# Patient Record
Sex: MALE | Race: ASIAN | NOT HISPANIC OR LATINO | Employment: FULL TIME | URBAN - METROPOLITAN AREA
[De-identification: names, ages, dates, MRNs, and addresses within clinical notes are randomized per-mention and may not be internally consistent; named-entity substitution may affect disease eponyms.]

---

## 2023-09-06 ENCOUNTER — OFFICE VISIT (OUTPATIENT)
Dept: FAMILY MEDICINE | Facility: CLINIC | Age: 33
End: 2023-09-06
Payer: COMMERCIAL

## 2023-09-06 VITALS
SYSTOLIC BLOOD PRESSURE: 125 MMHG | HEIGHT: 75 IN | RESPIRATION RATE: 17 BRPM | DIASTOLIC BLOOD PRESSURE: 85 MMHG | TEMPERATURE: 97.4 F | OXYGEN SATURATION: 98 % | WEIGHT: 243 LBS | HEART RATE: 80 BPM | BODY MASS INDEX: 30.21 KG/M2

## 2023-09-06 DIAGNOSIS — Z01.818 PREOP GENERAL PHYSICAL EXAM: ICD-10-CM

## 2023-09-06 DIAGNOSIS — S69.92XA INJURY OF LEFT INDEX FINGER, INITIAL ENCOUNTER: Primary | ICD-10-CM

## 2023-09-06 PROCEDURE — 99203 OFFICE O/P NEW LOW 30 MIN: CPT | Performed by: PHYSICIAN ASSISTANT

## 2023-09-06 RX ORDER — AMOXICILLIN AND CLAVULANATE POTASSIUM 562.5; 437.5; 62.5 MG/1; MG/1; MG/1
2 TABLET, MULTILAYER, EXTENDED RELEASE ORAL 2 TIMES DAILY
COMMUNITY

## 2023-09-06 RX ORDER — ACETAMINOPHEN 500 MG
500-1000 TABLET ORAL EVERY 6 HOURS PRN
COMMUNITY

## 2023-09-06 ASSESSMENT — PAIN SCALES - GENERAL: PAINLEVEL: NO PAIN (0)

## 2023-09-06 NOTE — PROGRESS NOTES
[address], [address]  [facility] [address]  Phone: [phone]  Primary Provider: No Ref-Primary, Physician  Pre-op Performing Provider: AKIRA VEGA      PREOPERATIVE EVALUATION:  Today's date: 9/6/2023    Connor Sharif is a 32 year old male who presents for a preoperative evaluation.    Surgical Information:  Surgery/Procedure: Left index finger flexor tendon repair  Surgery Location: [facility] orth[facility]  Surgeon: Dr. Fenton  Surgery Date: 9/07/2023  Time of Surgery: 6:15AM  Where patient plans to recover: At home with family  Fax number for surgical facility: [phone]    Assessment & Plan     The proposed surgical procedure is considered LOW risk.    Injury of left index finger, initial encounter  - pt was prescribed Augmentin in the ER    Preop general physical exam  - cleared for surgery as planned          - No identified additional risk factors other than previously addressed    Antiplatelet or Anticoagulation Medication Instructions:   - Patient is on no antiplatelet or anticoagulation medications.    Additional Medication Instructions:  Patient is on no additional chronic medications    RECOMMENDATION:  APPROVAL GIVEN to proceed with proposed procedure, without further diagnostic evaluation.    Subjective       HPI related to upcoming procedure: Fell hiking on 9/3/23 and cut L index finger on a rock, severing the tendon          9/6/2023    10:42 AM   Preop Questions   1. Have you ever had a heart attack or stroke? No   2. Have you ever had surgery on your heart or blood vessels, such as a stent placement, a coronary artery bypass, or surgery on an artery in your head, neck, heart, or legs? No   3. Do you have chest pain with activity? No   4. Do you have a history of  heart failure? No   5. Do you currently have a cold, bronchitis or symptoms of other infection? No   6. Do you have a cough, shortness of breath, or wheezing? No   7. Do you or anyone in  your family have previous history of blood clots? No   8. Do you or does anyone in your family have a serious bleeding problem such as prolonged bleeding following surgeries or cuts? No   9. Have you ever had problems with anemia or been told to take iron pills? No   10. Have you had any abnormal blood loss such as black, tarry or bloody stools? No   11. Have you ever had a blood transfusion? No   12. Are you willing to have a blood transfusion if it is medically needed before, during, or after your surgery? Yes   13. Have you or any of your relatives ever had problems with anesthesia? No   14. Do you have sleep apnea, excessive snoring or daytime drowsiness? YES - snoring   14a. Do you have a CPAP machine? No   15. Do you have any artifical heart valves or other implanted medical devices like a pacemaker, defibrillator, or continuous glucose monitor? No   16. Do you have artificial joints? No   17. Are you allergic to latex? No     Review of Systems  CONSTITUTIONAL: NEGATIVE for fever, chills, change in weight  ENT/MOUTH: NEGATIVE for ear, mouth and throat problems  RESP: NEGATIVE for significant cough or SOB  CV: NEGATIVE for chest pain, palpitations or peripheral edema    There are no problems to display for this patient.     No past medical history on file.  No past surgical history on file.  Current Outpatient Medications   Medication Sig Dispense Refill    acetaminophen (TYLENOL) 500 MG tablet Take 500-1,000 mg by mouth every 6 hours as needed for mild pain      amoxicillin-clavulanate (AUGMENTIN XR 1000 MG) Take 2 tablets by mouth 2 times daily         No Known Allergies     Social History     Tobacco Use    Smoking status: Never    Smokeless tobacco: Never   Substance Use Topics    Alcohol use: Not on file     No family history on file.  History   Drug Use Unknown         Objective     /85 (BP Location: Right arm, Patient Position: Sitting, Cuff Size: Adult Regular)   Pulse 80   Temp 97.4  F (36.3  " C) (Tympanic)   Resp 17   Ht 1.905 m (6' 3\")   Wt 110.2 kg (243 lb)   SpO2 98%   BMI 30.37 kg/m      Physical Exam  GENERAL APPEARANCE: L hand in splint. healthy, alert and no distress  HENT: ear canals and TM's normal and nose and mouth without ulcers or lesions  RESP: lungs clear to auscultation - no rales, rhonchi or wheezes  CV: regular rate and rhythm, normal S1 S2, no S3 or S4 and no murmur, click or rub   ABDOMEN: soft, nontender, no HSM or masses and bowel sounds normal  NEURO: Normal strength and tone, sensory exam grossly normal, mentation intact and speech normal    Diagnostics:  No labs were ordered during this visit.   No EKG required for low risk surgery (cataract, skin procedure, breast biopsy, etc).    Revised Cardiac Risk Index (RCRI):  The patient has the following serious cardiovascular risks for perioperative complications:   - No serious cardiac risks = 0 points     RCRI Interpretation: 0 points: Class I (very low risk - 0.4% complication rate)         Signed Electronically by: Paradise Mckenna PA-C  Copy of this evaluation report is provided to requesting physician.      "

## 2023-12-31 ENCOUNTER — HEALTH MAINTENANCE LETTER (OUTPATIENT)
Age: 33
End: 2023-12-31

## 2025-01-19 ENCOUNTER — HEALTH MAINTENANCE LETTER (OUTPATIENT)
Age: 35
End: 2025-01-19

## 2025-01-31 PROBLEM — R06.83 SNORING: Status: ACTIVE | Noted: 2025-01-31

## 2025-01-31 PROBLEM — E66.811 CLASS 1 OBESITY WITHOUT SERIOUS COMORBIDITY WITH BODY MASS INDEX (BMI) OF 31.0 TO 31.9 IN ADULT, UNSPECIFIED OBESITY TYPE: Status: ACTIVE | Noted: 2025-01-31

## 2025-02-01 ENCOUNTER — LAB (OUTPATIENT)
Dept: LAB | Facility: CLINIC | Age: 35
End: 2025-02-01
Payer: COMMERCIAL

## 2025-02-01 DIAGNOSIS — E66.811 CLASS 1 OBESITY WITHOUT SERIOUS COMORBIDITY WITH BODY MASS INDEX (BMI) OF 31.0 TO 31.9 IN ADULT, UNSPECIFIED OBESITY TYPE: ICD-10-CM

## 2025-02-01 LAB
ALBUMIN SERPL BCG-MCNC: 4.6 G/DL (ref 3.5–5.2)
ALP SERPL-CCNC: 79 U/L (ref 40–150)
ALT SERPL W P-5'-P-CCNC: 35 U/L (ref 0–70)
ANION GAP SERPL CALCULATED.3IONS-SCNC: 13 MMOL/L (ref 7–15)
AST SERPL W P-5'-P-CCNC: 19 U/L (ref 0–45)
BILIRUB SERPL-MCNC: 0.4 MG/DL
BUN SERPL-MCNC: 8.1 MG/DL (ref 6–20)
CALCIUM SERPL-MCNC: 8.5 MG/DL (ref 8.8–10.4)
CHLORIDE SERPL-SCNC: 102 MMOL/L (ref 98–107)
CREAT SERPL-MCNC: 0.94 MG/DL (ref 0.67–1.17)
EGFRCR SERPLBLD CKD-EPI 2021: >90 ML/MIN/1.73M2
ERYTHROCYTE [DISTWIDTH] IN BLOOD BY AUTOMATED COUNT: 12.2 % (ref 10–15)
EST. AVERAGE GLUCOSE BLD GHB EST-MCNC: 117 MG/DL
GLUCOSE SERPL-MCNC: 93 MG/DL (ref 70–99)
HBA1C MFR BLD: 5.7 % (ref 0–5.6)
HCO3 SERPL-SCNC: 25 MMOL/L (ref 22–29)
HCT VFR BLD AUTO: 46.2 % (ref 40–53)
HGB BLD-MCNC: 15.9 G/DL (ref 13.3–17.7)
MCH RBC QN AUTO: 29.5 PG (ref 26.5–33)
MCHC RBC AUTO-ENTMCNC: 34.4 G/DL (ref 31.5–36.5)
MCV RBC AUTO: 86 FL (ref 78–100)
PLATELET # BLD AUTO: 293 10E3/UL (ref 150–450)
POTASSIUM SERPL-SCNC: 3.9 MMOL/L (ref 3.4–5.3)
PROT SERPL-MCNC: 7.9 G/DL (ref 6.4–8.3)
RBC # BLD AUTO: 5.39 10E6/UL (ref 4.4–5.9)
SODIUM SERPL-SCNC: 140 MMOL/L (ref 135–145)
T4 FREE SERPL-MCNC: 1.12 NG/DL (ref 0.9–1.7)
TSH SERPL DL<=0.005 MIU/L-ACNC: 6.24 UIU/ML (ref 0.3–4.2)
VIT D+METAB SERPL-MCNC: 16 NG/ML (ref 20–50)
WBC # BLD AUTO: 6.2 10E3/UL (ref 4–11)

## 2025-02-01 PROCEDURE — 84439 ASSAY OF FREE THYROXINE: CPT

## 2025-02-01 PROCEDURE — 36415 COLL VENOUS BLD VENIPUNCTURE: CPT

## 2025-02-01 PROCEDURE — 82306 VITAMIN D 25 HYDROXY: CPT

## 2025-02-01 PROCEDURE — 84443 ASSAY THYROID STIM HORMONE: CPT

## 2025-02-01 PROCEDURE — 83036 HEMOGLOBIN GLYCOSYLATED A1C: CPT

## 2025-02-01 PROCEDURE — 85027 COMPLETE CBC AUTOMATED: CPT

## 2025-02-01 PROCEDURE — 80053 COMPREHEN METABOLIC PANEL: CPT

## 2025-02-03 DIAGNOSIS — R79.89 LOW VITAMIN D LEVEL: Primary | ICD-10-CM

## 2025-02-05 ENCOUNTER — TELEPHONE (OUTPATIENT)
Dept: SURGERY | Facility: CLINIC | Age: 35
End: 2025-02-05

## 2025-02-05 ENCOUNTER — VIRTUAL VISIT (OUTPATIENT)
Dept: FAMILY MEDICINE | Facility: CLINIC | Age: 35
End: 2025-02-05
Payer: COMMERCIAL

## 2025-02-05 DIAGNOSIS — E66.811 CLASS 1 OBESITY WITHOUT SERIOUS COMORBIDITY WITH BODY MASS INDEX (BMI) OF 31.0 TO 31.9 IN ADULT, UNSPECIFIED OBESITY TYPE: ICD-10-CM

## 2025-02-05 DIAGNOSIS — E55.9 VITAMIN D DEFICIENCY: ICD-10-CM

## 2025-02-05 DIAGNOSIS — E83.51 HYPOCALCEMIA: ICD-10-CM

## 2025-02-05 DIAGNOSIS — E03.9 HYPOTHYROIDISM, UNSPECIFIED TYPE: Primary | ICD-10-CM

## 2025-02-05 NOTE — PROGRESS NOTES
"Connor is a 34 year old who is being evaluated via a billable video visit.    How would you like to obtain your AVS? MyChart  If the video visit is dropped, the invitation should be resent by: Text to cell phone: [phone]  Will anyone else be joining your video visit? No      Assessment & Plan   Problem List Items Addressed This Visit       Class 1 obesity without serious comorbidity with body mass index (BMI) of 31.0 to 31.9 in adult, unspecified obesity type     Other Visit Diagnoses       Hypothyroidism, unspecified type    -  Primary    Relevant Orders    Thyroid peroxidase antibody    TSH with free T4 reflex    Vitamin D deficiency        Relevant Medications    vitamin D3 (CHOLECALCIFEROL) 1.25 MG (23432 UT) capsule    Hypocalcemia        Relevant Orders    Ionized Calcium             Continue with lifestyle changes ,exercise and weight loss.  Consideration for GLP-1 agonist injection per the weight management clinic.  Discussed pros and cons of GLP-1 agonist and side effects long-term.  Denies any family or personal history of thyroid cancer or pancreatitis.  His vitamin D was low deficient ,started on vitamin D replacement high dose and repeat labs in 8 weeks including thyroid function testing ,vitamin D calcium level.  Discussed signs or symptoms of low vitamin D and hypocalcemia.  Keep adequate hydration.  Follow-up with sleep medicine.  All questions answered.     BMI  Estimated body mass index is 31.25 kg/m  as calculated from the following:    Height as of 1/31/25: 1.905 m (6' 3\").    Weight as of 1/31/25: 113.4 kg (250 lb).   Weight management plan: Discussed healthy diet and exercise guidelines    Work on weight loss  Regular exercise  See Patient Instructions      Subjective   Connor is a 34 year old, presenting for the following health issues:  Results (Recent blood work indicated high TSH levels)        2/5/2025     9:11 AM   Additional Questions   Roomed by Dana ROB MA     History of Present " "Illness       Reason for visit:  My recent blood work indicated high TSH levels   He is taking medications regularly.     Patient presenting for question regarding lab results.  First time seeing patient ,he follows with bariatric clinic get some labs done which showed borderline elevated TSH with normal free T4.  He is working to be started on GLP-1 agonist has been trying to lose weight ,exercise but it seems his metabolism has been steady and he has not been able to lose weight.  He just drinks alcohol socially.  He does have occasional snoring related symptoms has been more tired,  he has been taking more frequent naps but does not think he has sleep apnea.  He does not wake up for refreshed.  He does go on intermittent fasting diet ,his blood pressure at home ranges 133-134/86.  His significant other is a dentist who specializes in sleep medicine as well.  Labs also showed low vitamin D and low calcium.      Review of Systems  Constitutional, HEENT, cardiovascular, pulmonary, gi and gu systems are negative, except as otherwise noted.      Objective    Vitals - Patient Reported  Weight (Patient Reported): 112.5 kg (248 lb)  Height (Patient Reported): 190.5 cm (6' 3\")  BMI (Based on Pt Reported Ht/Wt): 31      Vitals:  No vitals were obtained today due to virtual visit.    Physical Exam   GENERAL: alert and no distress  EYES: Eyes grossly normal to inspection.  No discharge or erythema, or obvious scleral/conjunctival abnormalities.  RESP: No audible wheeze, cough, or visible cyanosis.    SKIN: Visible skin clear. No significant rash, abnormal pigmentation or lesions.  NEURO: Cranial nerves grossly intact.  Mentation and speech appropriate for age.  PSYCH: Appropriate affect, tone, and pace of words    Lab on 02/01/2025   Component Date Value Ref Range Status    TSH 02/01/2025 6.24 (H)  0.30 - 4.20 uIU/mL Final    Estimated Average Glucose 02/01/2025 117 (H)  <117 mg/dL Final    Hemoglobin A1C 02/01/2025 5.7 (H)  " 0.0 - 5.6 % Final    Normal <5.7%   Prediabetes 5.7-6.4%    Diabetes 6.5% or higher     Note: Adopted from ADA consensus guidelines.    Vitamin D, Total (25-Hydroxy) 02/01/2025 16 (L)  20 - 50 ng/mL Final    mild to moderate deficiency    Sodium 02/01/2025 140  135 - 145 mmol/L Final    Potassium 02/01/2025 3.9  3.4 - 5.3 mmol/L Final    Carbon Dioxide (CO2) 02/01/2025 25  22 - 29 mmol/L Final    Anion Gap 02/01/2025 13  7 - 15 mmol/L Final    Urea Nitrogen 02/01/2025 8.1  6.0 - 20.0 mg/dL Final    Creatinine 02/01/2025 0.94  0.67 - 1.17 mg/dL Final    GFR Estimate 02/01/2025 >90  >60 mL/min/1.73m2 Final    eGFR calculated using 2021 CKD-EPI equation.    Calcium 02/01/2025 8.5 (L)  8.8 - 10.4 mg/dL Final    Chloride 02/01/2025 102  98 - 107 mmol/L Final    Glucose 02/01/2025 93  70 - 99 mg/dL Final    Alkaline Phosphatase 02/01/2025 79  40 - 150 U/L Final    AST 02/01/2025 19  0 - 45 U/L Final    ALT 02/01/2025 35  0 - 70 U/L Final    Protein Total 02/01/2025 7.9  6.4 - 8.3 g/dL Final    Albumin 02/01/2025 4.6  3.5 - 5.2 g/dL Final    Bilirubin Total 02/01/2025 0.4  <=1.2 mg/dL Final    WBC Count 02/01/2025 6.2  4.0 - 11.0 10e3/uL Final    RBC Count 02/01/2025 5.39  4.40 - 5.90 10e6/uL Final    Hemoglobin 02/01/2025 15.9  13.3 - 17.7 g/dL Final    Hematocrit 02/01/2025 46.2  40.0 - 53.0 % Final    MCV 02/01/2025 86  78 - 100 fL Final    MCH 02/01/2025 29.5  26.5 - 33.0 pg Final    MCHC 02/01/2025 34.4  31.5 - 36.5 g/dL Final    RDW 02/01/2025 12.2  10.0 - 15.0 % Final    Platelet Count 02/01/2025 293  150 - 450 10e3/uL Final    Free T4 02/01/2025 1.12  0.90 - 1.70 ng/dL Final         Video-Visit Details    Type of service:  Video Visit   Originating Location (pt. Location): Home    Distant Location (provider location):  On-site  Platform used for Video Visit: Cate  Signed Electronically by: Vincent Omer MD

## 2025-02-05 NOTE — TELEPHONE ENCOUNTER
"Prior Authorization Retail Medication Request    Medication/Dose: Zepbound 2.5 MG and 5 MG    ICD code (if different than what is on RX):  [E66.811, Z68.31]     Previously Tried and Failed:  Diet and life style changes    Rationale:  Weight Management    Hx of obesity   Ht: 6' 3\"  Body mass index is 31.25 kg/m .   Current Weight:  250 lb (113.4 kg)     Insurance Name:  [employer]   Insurance ID:  [id number]       Pharmacy Information (if different than what is on RX)  Name:    [facility] #[id number] - [place], [place] - [address] & [facility]     Phone:  [phone]    "

## 2025-02-10 ENCOUNTER — TELEPHONE (OUTPATIENT)
Dept: SURGERY | Facility: CLINIC | Age: 35
End: 2025-02-10
Payer: COMMERCIAL

## 2025-02-10 DIAGNOSIS — R06.83 SNORING: ICD-10-CM

## 2025-02-10 DIAGNOSIS — E66.811 CLASS 1 OBESITY WITHOUT SERIOUS COMORBIDITY WITH BODY MASS INDEX (BMI) OF 31.0 TO 31.9 IN ADULT, UNSPECIFIED OBESITY TYPE: ICD-10-CM

## 2025-02-11 NOTE — TELEPHONE ENCOUNTER
PA Initiation    Medication: ZEPBOUND 2.5 MG/0.5ML SC SOAJ  Insurance Company: [employer] Non-Specialty PA's - Phone [phone] Fax [phone]  Pharmacy Filling the Rx: [facility] #[id number] - [place], [place] - [address] & [facility]  Filling Pharmacy Phone: [phone]  Filling Pharmacy Fax: [phone]  Start Date: 2/10/2025

## 2025-02-11 NOTE — TELEPHONE ENCOUNTER
Orders for tirzepatide 2.5 and 5 mg reordered due to need to change what deepti inject from 4 each to # mg.  This was done per clinic protocol.  Khloe Cruz MS, RD, RN

## 2025-02-11 NOTE — TELEPHONE ENCOUNTER
PRIOR AUTHORIZATION DENIED    Medication: ZEPBOUND 2.5 MG/0.5ML SC SOAJ    Insurance Company: [employer] Non-Specialty PA's - Phone [phone] Fax [phone]    Denial Date: 2/10/2025    Denial Reason(s): Excluded

## 2025-02-11 NOTE — TELEPHONE ENCOUNTER
PRIOR AUTHORIZATION DENIED    Medication: ZEPBOUND 5 MG/0.5ML SC SOAJ    Insurance Company: [employer] Non-Specialty PA's - Phone [phone] Fax [phone]    Denial Date: 2/10/2025    Denial Reason(s): Excluded

## 2025-02-11 NOTE — TELEPHONE ENCOUNTER
PA Initiation    Medication: ZEPBOUND 5 MG/0.5ML SC SOAJ  Insurance Company: [employer] Non-Specialty PA's - Phone [phone] Fax [phone]  Pharmacy Filling the Rx: [id number] - [place], [place] - [address] & [facility]  Filling Pharmacy Phone: [phone]  Filling Pharmacy Fax: [phone]  Start Date: 2/10/2025

## 2025-06-17 DIAGNOSIS — E66.811 CLASS 1 OBESITY WITHOUT SERIOUS COMORBIDITY WITH BODY MASS INDEX (BMI) OF 31.0 TO 31.9 IN ADULT, UNSPECIFIED OBESITY TYPE: ICD-10-CM

## 2025-06-17 DIAGNOSIS — R06.83 SNORING: ICD-10-CM

## 2025-06-17 RX ORDER — TIRZEPATIDE 5 MG/.5ML
INJECTION, SOLUTION SUBCUTANEOUS
Qty: 2 ML | Refills: 1 | Status: SHIPPED | OUTPATIENT
Start: 2025-06-17

## 2025-07-14 NOTE — PROGRESS NOTES
2025      Return Medical Weight Management Note     Connor Sharif  MRN:  [id number]  :  1990    Assessment & Plan   Problem List Items Addressed This Visit       Snoring    Hx of obesity - Primary    Overweight with body mass index (BMI) of 29 to 29.9 in adult    Patient was congratulated on wt loss success thus far. Healthy habits to assist with further weight loss were discussed. Connor will continue 5 mg Zepbound weekly. We reviewed recommendations for muscle conservation including eating three meals daily, good protein intake, and strength training.          Relevant Medications    tirzepatide-weight management (TIRZEPATIDE) 5 MG/0.5ML vial        AOM Considerations, notes from prior:  Phentermine:   Monitor BP elevated   Topiramate:     Headaches:                 No/rarely   GLP-1: Utilizing Zepbound out-of-pocket vials.                  Naltrexone:        Wellbutrin: Monitor BP elevated    Metformin:         Contrave:  Qsymia:        PATIENT INSTRUCTIONS:  Pt Instructions:  Labs ordered from prior with Dr. Omer and repeat vitamin D with me.  Please call [phone] to set up a lab appt.    Continue 5 mg Zepbound weekly.    Goals:  Focus on healthy food choices - prioritizing protein and veggies in your meals. I recommend eating every 4-6 hours to reduce the risk of low blood sugars and try to minimize snacking between meals. Stay well hydrated though the day as well.  Great job with exercising - please continue to invest in yourself with regular exercise. Continue to strive for a range for 150-300 minutes of exercise for weight maintenance and incorporating strength training twice-three times a week to help preserve muscle!      Follow up:    Call [phone] to schedule next visit in next available in 3 months. Be in touch on [employer] for refills/concerns between visits    20 minutes spent on the date of the encounter doing chart review, history and exam, result review, counseling,  developing plan of care, documentation, and further activities as noted      The longitudinal plan of care for the diagnosis(es)/condition(s) as documented were addressed during this visit. Due to the added complexity in care, I will continue to support Connor in the subsequent management and with ongoing continuity of care.      INTERVAL HISTORY:  Connor returns for medical weight management follow up.  Last seen on 1/31/2025 with myself and plan for Zepbound - with insurance lack of coverage he is going out of pocket.    2/3/25 new preDM 5.7 2/1/25, TSH elevated      6/17/25  Zepbound refilled.Here you    He does feel the the 5 mg Zepbound dose is  Going well he reports feeling well at this current dose. Noting some  Reflux that is well controlled with omeprazole and not eating later in and not eating later the in the evening. He feels this is well controlled. He is eating three meals a day and focusing on good protein.    Exercising at least weekly going out and doing hikes. He also started going to the gym and doing weight lifting. He gets to the gym at least twice weekly.    No other concerns today - he wants to continue at 5 mg Zepbound and feels he is having a steady weight response with this dose that isn't too fast which he is happy about.     WEIGHT METRICS:  Body mass index is 29.25 kg/m .   Current Weight: 234 lb (106.1 kg)  Last Visits Weight: 250 lb (113.4 kg)  Initial Weight (lbs): 250 lbs  Cumulative weight loss (lbs): 16  Weight Loss Percentage: 6.4%    Wt Readings from Last 10 Encounters:   07/16/25 234 lb (106.1 kg)   01/31/25 250 lb (113.4 kg)   09/06/23 243 lb (110.2 kg)             LABS:  Hemoglobin A1C   Date Value Ref Range Status   02/01/2025 5.7 (H) 0.0 - 5.6 % Final     Comment:     Normal <5.7%   Prediabetes 5.7-6.4%    Diabetes 6.5% or higher     Note: Adopted from ADA consensus guidelines.     Creatinine   Date Value Ref Range Status   02/01/2025 0.94 0.67 - 1.17 mg/dL Final     GFR  "Estimate   Date Value Ref Range Status   02/01/2025 >90 >60 mL/min/1.73m2 Final     Comment:     eGFR calculated using 2021 CKD-EPI equation.     Carbon Dioxide (CO2)   Date Value Ref Range Status   02/01/2025 25 22 - 29 mmol/L Final     Chloride   Date Value Ref Range Status   02/01/2025 102 98 - 107 mmol/L Final     Urea Nitrogen   Date Value Ref Range Status   02/01/2025 8.1 6.0 - 20.0 mg/dL Final     ALT   Date Value Ref Range Status   02/01/2025 35 0 - 70 U/L Final     AST   Date Value Ref Range Status   02/01/2025 19 0 - 45 U/L Final     Vitamin D, Total (25-Hydroxy)   Date Value Ref Range Status   02/01/2025 16 (L) 20 - 50 ng/mL Final     Comment:     mild to moderate deficiency     TSH   Date Value Ref Range Status   02/01/2025 6.24 (H) 0.30 - 4.20 uIU/mL Final        BP Readings from Last 6 Encounters:   07/16/25 117/75   01/31/25 139/87   09/06/23 125/85       Pulse Readings from Last 6 Encounters:   07/16/25 90   01/31/25 87   09/06/23 80       PE:  /75   Pulse 90   Ht 6' 3\" (1.905 m)   Wt 234 lb (106.1 kg)   SpO2 97%   BMI 29.25 kg/m    GENERAL: Healthy, alert and no distress  EYES: Eyes grossly normal to inspection.    RESP: No audible wheeze, cough, or visible cyanosis.  No increased work of breathing.    SKIN: Visible skin clear. No significant rash, abnormal pigmentation or lesions.  NEURO: Mentation and speech appropriate for age.  PSYCH: Mentation appears normal, affect normal/bright, judgement and insight intact, normal speech and appearance well-groomed.      Sincerely,      Tanesha Lee PA-C         "

## 2025-07-16 ENCOUNTER — OFFICE VISIT (OUTPATIENT)
Dept: SURGERY | Facility: CLINIC | Age: 35
End: 2025-07-16
Payer: COMMERCIAL

## 2025-07-16 VITALS
OXYGEN SATURATION: 97 % | HEIGHT: 75 IN | HEART RATE: 90 BPM | WEIGHT: 234 LBS | SYSTOLIC BLOOD PRESSURE: 117 MMHG | BODY MASS INDEX: 29.09 KG/M2 | DIASTOLIC BLOOD PRESSURE: 75 MMHG

## 2025-07-16 DIAGNOSIS — R06.83 SNORING: ICD-10-CM

## 2025-07-16 DIAGNOSIS — E66.3 OVERWEIGHT WITH BODY MASS INDEX (BMI) OF 29 TO 29.9 IN ADULT: ICD-10-CM

## 2025-07-16 DIAGNOSIS — Z86.39 HX OF OBESITY: Primary | ICD-10-CM

## 2025-07-16 PROCEDURE — 3074F SYST BP LT 130 MM HG: CPT | Performed by: PHYSICIAN ASSISTANT

## 2025-07-16 PROCEDURE — 99213 OFFICE O/P EST LOW 20 MIN: CPT | Performed by: PHYSICIAN ASSISTANT

## 2025-07-16 PROCEDURE — 3078F DIAST BP <80 MM HG: CPT | Performed by: PHYSICIAN ASSISTANT

## 2025-07-16 PROCEDURE — G2211 COMPLEX E/M VISIT ADD ON: HCPCS | Performed by: PHYSICIAN ASSISTANT

## 2025-07-16 RX ORDER — TIRZEPATIDE 5 MG/.5ML
5 INJECTION, SOLUTION SUBCUTANEOUS WEEKLY
Qty: 2 ML | Refills: 2 | Status: SHIPPED | OUTPATIENT
Start: 2025-07-16

## 2025-07-16 NOTE — ASSESSMENT & PLAN NOTE
Patient was congratulated on wt loss success thus far. Healthy habits to assist with further weight loss were discussed. Connor will continue 5 mg Zepbound weekly. We reviewed recommendations for muscle conservation including eating three meals daily, good protein intake, and strength training.

## 2025-07-16 NOTE — PATIENT INSTRUCTIONS
Nice to talk with you today. Below is the plan discussed.-  Tanesha Lee PA-C     Pt Instructions:  Labs ordered from prior with Dr. Omer and repeat vitamin D with me.  Please call [phone] to set up a lab appt.    Continue 5 mg Zepbound weekly.    Goals:  Focus on healthy food choices - prioritizing protein and veggies in your meals. I recommend eating every 4-6 hours to reduce the risk of low blood sugars and try to minimize snacking between meals. Stay well hydrated though the day as well.  Great job with exercising - please continue to invest in yourself with regular exercise. Continue to strive for a range for 150-300 minutes of exercise for weight maintenance and incorporating strength training twice-three times a week to help preserve muscle!      Follow up:    Call [phone] to schedule next visit in next available in 3 months. Be in touch on Mychart for refills/concerns between visits